# Patient Record
Sex: MALE
[De-identification: names, ages, dates, MRNs, and addresses within clinical notes are randomized per-mention and may not be internally consistent; named-entity substitution may affect disease eponyms.]

---

## 2023-01-06 PROBLEM — Z00.00 ENCOUNTER FOR PREVENTIVE HEALTH EXAMINATION: Status: ACTIVE | Noted: 2023-01-06

## 2023-01-13 ENCOUNTER — APPOINTMENT (OUTPATIENT)
Dept: OTOLARYNGOLOGY | Facility: CLINIC | Age: 48
End: 2023-01-13
Payer: COMMERCIAL

## 2023-01-13 VITALS — BODY MASS INDEX: 24.25 KG/M2 | WEIGHT: 160 LBS | TEMPERATURE: 97.3 F | HEIGHT: 68 IN

## 2023-01-13 DIAGNOSIS — F17.200 NICOTINE DEPENDENCE, UNSPECIFIED, UNCOMPLICATED: ICD-10-CM

## 2023-01-13 DIAGNOSIS — Z78.9 OTHER SPECIFIED HEALTH STATUS: ICD-10-CM

## 2023-01-13 DIAGNOSIS — H90.5 UNSPECIFIED SENSORINEURAL HEARING LOSS: ICD-10-CM

## 2023-01-13 DIAGNOSIS — H93.293 OTHER ABNORMAL AUDITORY PERCEPTIONS, BILATERAL: ICD-10-CM

## 2023-01-13 DIAGNOSIS — H93.13 TINNITUS, BILATERAL: ICD-10-CM

## 2023-01-13 DIAGNOSIS — H69.83 OTHER SPECIFIED DISORDERS OF EUSTACHIAN TUBE, BILATERAL: ICD-10-CM

## 2023-01-13 DIAGNOSIS — F10.21 ALCOHOL DEPENDENCE, IN REMISSION: ICD-10-CM

## 2023-01-13 DIAGNOSIS — Z82.49 FAMILY HISTORY OF ISCHEMIC HEART DISEASE AND OTHER DISEASES OF THE CIRCULATORY SYSTEM: ICD-10-CM

## 2023-01-13 DIAGNOSIS — Z87.09 PERSONAL HISTORY OF OTHER DISEASES OF THE RESPIRATORY SYSTEM: ICD-10-CM

## 2023-01-13 DIAGNOSIS — J31.0 CHRONIC RHINITIS: ICD-10-CM

## 2023-01-13 DIAGNOSIS — K21.9 GASTRO-ESOPHAGEAL REFLUX DISEASE W/OUT ESOPHAGITIS: ICD-10-CM

## 2023-01-13 DIAGNOSIS — J32.8 OTHER CHRONIC SINUSITIS: ICD-10-CM

## 2023-01-13 DIAGNOSIS — H61.21 IMPACTED CERUMEN, RIGHT EAR: ICD-10-CM

## 2023-01-13 PROCEDURE — 92557 COMPREHENSIVE HEARING TEST: CPT

## 2023-01-13 PROCEDURE — 92567 TYMPANOMETRY: CPT

## 2023-01-13 PROCEDURE — 99204 OFFICE O/P NEW MOD 45 MIN: CPT | Mod: 25

## 2023-01-13 PROCEDURE — 31231 NASAL ENDOSCOPY DX: CPT

## 2023-01-13 RX ORDER — LAMOTRIGINE 200 MG/1
200 TABLET ORAL
Refills: 0 | Status: ACTIVE | COMMUNITY

## 2023-01-13 RX ORDER — PREDNISONE 10 MG/1
10 TABLET ORAL
Qty: 12 | Refills: 0 | Status: ACTIVE | COMMUNITY
Start: 2023-01-13 | End: 1900-01-01

## 2023-01-13 NOTE — CONSULT LETTER
[Dear  ___] : Dear  [unfilled], [Consult Letter:] : I had the pleasure of evaluating your patient, [unfilled]. [Please see my note below.] : Please see my note below. [Consult Closing:] : Thank you very much for allowing me to participate in the care of this patient.  If you have any questions, please do not hesitate to contact me. [Sincerely,] : Sincerely, [FreeTextEntry3] : Gale Loo MD\par

## 2023-01-13 NOTE — HISTORY OF PRESENT ILLNESS
[de-identified] : SHAE AMOS is a 47 year old patient here for tinnitus, eustachian tube dysfunction, and head congestion.  He was referred by Dr. Hansen.  He had a sinus cold in October while he was in [place].  He had ear pressure and eustachian tube dysfunction.  He had COVID in mid December.  Since then, he has had tinnitus, ear pressure, nasal and sinus congestion.  He has no otalgia, otorrhea, or dizziness.  He has no sinus pain or throat symptoms.  He has tried Flonase but not regularly because it causes bleeding and dryness and Zyrtec.\par \par No history of recurrent middle ear infections, prior otologic surgery, or ear trauma.\par He does have a history of noise exposure from machinery.  He has not had a recent audiogram\par \par He has a history of allergies.  He had immunotherapy when he was a child.  His allergies have improved over time\par No history of nasal trauma or nasal/sinus surgery\par He denies a history of reflux\par He is a smoker

## 2023-01-13 NOTE — ASSESSMENT
[FreeTextEntry1] : He has had chronic rhinitis and eustachian tube dysfunction.  He had a head cold while in [place] in October and then COVID in December.  He had cerumen impaction in the right ear on exam which was removed.  His ears were otherwise normal.  Audiogram showed normal hearing except for mild sensorineural hearing loss in the left ear at 6000 Hz.  He had normal tympanograms.  Nasal endoscopy and flexible laryngoscopy showed a deviated septum with mild nasal mucosal edema and reflux related laryngeal changes.  There is no purulent drainage or congestion\par \par PLAN\par \par -findings and management options discussed in detail with the patient. \par -good aural hygiene\par -avoid using cotton swabs in the ears\par -noise precautions\par -annual audiogram.  We will monitor the asymmetry\par -Nasal saline irrigations and antihistamine or decongestant as needed\par -I recommended trying a nasal steroid spray.  He may use a moisturizing nasal gel to prevent nosebleeds.\par -He may try a short burst of prednisone.  He has taken the medication in the past.  I have asked him to take it with food and avoid strenuous activity.  He may also consider taking Pepcid\par -Reflux precautions and OTC Pepcid as needed\par -Smoking cessation strongly recommended\par -follow up in 2 to 3 weeks\par -call and return earlier if any concerns.

## 2023-01-13 NOTE — REASON FOR VISIT
[Initial Evaluation] : an initial evaluation for [FreeTextEntry2] : Ear pressure, head congestion, and tinnitus